# Patient Record
Sex: FEMALE | Race: WHITE | NOT HISPANIC OR LATINO | Employment: OTHER | URBAN - NONMETROPOLITAN AREA
[De-identification: names, ages, dates, MRNs, and addresses within clinical notes are randomized per-mention and may not be internally consistent; named-entity substitution may affect disease eponyms.]

---

## 2017-10-30 DIAGNOSIS — I10 BENIGN ESSENTIAL HTN: ICD-10-CM

## 2017-10-30 DIAGNOSIS — Z13.220 ENCOUNTER FOR LIPID SCREENING FOR CARDIOVASCULAR DISEASE: ICD-10-CM

## 2017-10-30 DIAGNOSIS — Z13.6 ENCOUNTER FOR LIPID SCREENING FOR CARDIOVASCULAR DISEASE: ICD-10-CM

## 2017-10-30 DIAGNOSIS — Z85.820 HISTORY OF MELANOMA: ICD-10-CM

## 2017-10-30 RX ORDER — LISINOPRIL AND HYDROCHLOROTHIAZIDE 25; 20 MG/1; MG/1
1 TABLET ORAL DAILY
Qty: 30 TAB | Refills: 0 | Status: SHIPPED | OUTPATIENT
Start: 2017-10-30 | End: 2017-11-14 | Stop reason: SDUPTHER

## 2017-10-30 NOTE — TELEPHONE ENCOUNTER
Was the patient seen in the last year in this department? No- Has an apt. To establish with you on 11/8/17.    Does patient have an active prescription for medications requested? Yes     Received Request Via: Patient

## 2017-11-14 DIAGNOSIS — I10 BENIGN ESSENTIAL HTN: ICD-10-CM

## 2017-11-14 RX ORDER — LISINOPRIL AND HYDROCHLOROTHIAZIDE 25; 20 MG/1; MG/1
1 TABLET ORAL DAILY
Qty: 30 TAB | Refills: 0 | Status: SHIPPED | OUTPATIENT
Start: 2017-11-14 | End: 2017-12-04 | Stop reason: SDUPTHER

## 2017-12-04 ENCOUNTER — OFFICE VISIT (OUTPATIENT)
Dept: MEDICAL GROUP | Facility: CLINIC | Age: 72
End: 2017-12-04
Payer: COMMERCIAL

## 2017-12-04 VITALS
WEIGHT: 170 LBS | OXYGEN SATURATION: 95 % | DIASTOLIC BLOOD PRESSURE: 78 MMHG | HEIGHT: 60 IN | HEART RATE: 88 BPM | SYSTOLIC BLOOD PRESSURE: 130 MMHG | TEMPERATURE: 99.2 F | BODY MASS INDEX: 33.38 KG/M2

## 2017-12-04 DIAGNOSIS — Z86.69 HISTORY OF CATARACT: ICD-10-CM

## 2017-12-04 DIAGNOSIS — E66.9 OBESITY (BMI 30-39.9): ICD-10-CM

## 2017-12-04 DIAGNOSIS — I10 BENIGN ESSENTIAL HTN: ICD-10-CM

## 2017-12-04 PROCEDURE — 99213 OFFICE O/P EST LOW 20 MIN: CPT | Performed by: PHYSICIAN ASSISTANT

## 2017-12-04 RX ORDER — LISINOPRIL AND HYDROCHLOROTHIAZIDE 25; 20 MG/1; MG/1
1 TABLET ORAL DAILY
Qty: 90 TAB | Refills: 3 | Status: SHIPPED | OUTPATIENT
Start: 2017-12-04

## 2017-12-04 ASSESSMENT — PAIN SCALES - GENERAL: PAINLEVEL: NO PAIN

## 2017-12-04 NOTE — PROGRESS NOTES
Chief Complaint   Patient presents with   • Cataract     Wants referral- Establish Care       HISTORY OF PRESENT ILLNESS: Patient is a 72 y.o. female established patient who presents today for evaluation and management of:    History of cataract  Patient states she's been followed by ophthalmology for this for many years and believes it is getting worse including increased blurry vision. She is due for recheck in requests a referral today.    Benign essential HTN  Well-controlled at this time with blood pressure of 130/78 today and a pulse of 88. Patient denies chest pain, shortness of breath, or urinary changes. She continues taking lisinopril-hydrochlorothiazide 20-25 milligram on a daily basis. She denies any adverse effects of the medication although she is due for repeat lab work.    Obesity (BMI 30-39.9)  Patient has lost 9 pounds over the last year and intends to lose more in the near future. She states she has recently been exercising more.       Patient Active Problem List    Diagnosis Date Noted   • Obesity (BMI 30-39.9) 2017   • Benign essential HTN 2016   • History of cataract 2016   • History of melanoma 2016       Allergies:Patient has no known allergies.    Current Outpatient Prescriptions   Medication Sig Dispense Refill   • lisinopril-hydrochlorothiazide (PRINZIDE, ZESTORETIC) 20-25 MG per tablet Take 1 Tab by mouth every day. 90 Tab 3     No current facility-administered medications for this visit.        Social History   Substance Use Topics   • Smoking status: Former Smoker     Packs/day: 1.00     Years: 30.00     Types: Cigarettes     Quit date: 1998   • Smokeless tobacco: Never Used   • Alcohol use Yes      Comment: Occ       Family Status   Relation Status   • Mother    • Father      Family History   Problem Relation Age of Onset   • Cancer Mother      small cell       Review of Systems:   Constitutional: Negative for fever, chills, unintended  "weight loss and malaise/fatigue.   Eyes: Positive for blurred vision due to cataract.   Respiratory: Negative for cough, shortness of breath.    Cardiovascular: Negative for chest pain, palpitations and leg swelling.    Neurological: Negative for dizziness and headaches.   Endo/Heme/Allergies: Does not bruise/bleed easily.   Psychiatric/Behavioral: Positive for some depression due to 's health status. Negative for suicidal ideas and memory loss.  The patient is not nervous/anxious and does not have insomnia.      Exam:  Blood pressure 130/78, pulse 88, temperature 37.3 °C (99.2 °F), height 1.511 m (4' 11.5\"), weight 77.1 kg (170 lb), SpO2 95 %.  Body mass index is 33.76 kg/m².  General:  Overweight female in NAD  Head: is grossly normal.  Neck:  Thyroid is not visibly enlarged.  Pulmonary: Clear to ausculation. Normal effort. No rales, ronchi, or wheezing.  Cardiovascular: Regular rate and rhythm without murmur. Carotid and radial pulses are intact and equal bilaterally.  Extremities: no clubbing, cyanosis, or edema.    Medical decision-making and discussion:  1. History of cataract  - REFERRAL TO OPHTHALMOLOGY    2. Benign essential HTN  - lisinopril-hydrochlorothiazide (PRINZIDE, ZESTORETIC) 20-25 MG per tablet; Take 1 Tab by mouth every day.  Dispense: 90 Tab; Refill: 3    3. Obesity (BMI 30-39.9)  - Patient identified as having weight management issue.  Appropriate orders and counseling given.        Please note that this dictation was created using voice recognition software. I have made every reasonable attempt to correct obvious errors, but I expect that there are errors of grammar and possibly content that I did not discover before finalizing the note.      Return in about 1 year (around 12/4/2018).  "

## 2017-12-04 NOTE — ASSESSMENT & PLAN NOTE
Patient has lost 9 pounds over the last year and intends to lose more in the near future. She states she has recently been exercising more.

## 2017-12-04 NOTE — ASSESSMENT & PLAN NOTE
Well-controlled at this time with blood pressure of 130/78 today and a pulse of 88. Patient denies chest pain, shortness of breath, or urinary changes. She continues taking lisinopril-hydrochlorothiazide 20-25 milligram on a daily basis. She denies any adverse effects of the medication although she is due for repeat lab work.

## 2017-12-04 NOTE — ASSESSMENT & PLAN NOTE
Patient states she's been followed by ophthalmology for this for many years and believes it is getting worse including increased blurry vision. She is due for recheck in requests a referral today.

## 2018-03-27 ENCOUNTER — TELEPHONE (OUTPATIENT)
Dept: MEDICAL GROUP | Facility: CLINIC | Age: 73
End: 2018-03-27

## 2018-03-27 DIAGNOSIS — R19.5 POSITIVE COLORECTAL CANCER SCREENING USING DNA-BASED STOOL TEST: ICD-10-CM

## 2018-03-28 NOTE — TELEPHONE ENCOUNTER
Patient has a positive cologuard test and was called and notified of these results. She was informed that a colonoscopy was ordered for her on an urgent basis and that she should follow up with this as soon as possible. She confirmed that she will do so.

## 2018-04-04 NOTE — PROGRESS NOTES
I reviewed labs. There is a value I am concerned about and would like to have the patient return for follow up to discuss.

## 2018-05-09 ENCOUNTER — OFFICE VISIT (OUTPATIENT)
Dept: MEDICAL GROUP | Facility: CLINIC | Age: 73
End: 2018-05-09
Payer: COMMERCIAL

## 2018-05-09 VITALS
RESPIRATION RATE: 16 BRPM | DIASTOLIC BLOOD PRESSURE: 78 MMHG | HEART RATE: 86 BPM | HEIGHT: 60 IN | BODY MASS INDEX: 32.59 KG/M2 | OXYGEN SATURATION: 97 % | WEIGHT: 166 LBS | SYSTOLIC BLOOD PRESSURE: 120 MMHG | TEMPERATURE: 97.9 F

## 2018-05-09 DIAGNOSIS — I10 BENIGN ESSENTIAL HTN: ICD-10-CM

## 2018-05-09 DIAGNOSIS — E66.9 OBESITY (BMI 30-39.9): ICD-10-CM

## 2018-05-09 DIAGNOSIS — E78.2 MIXED HYPERLIPIDEMIA: ICD-10-CM

## 2018-05-09 DIAGNOSIS — Z13.6 SCREENING FOR CARDIOVASCULAR CONDITION: ICD-10-CM

## 2018-05-09 PROCEDURE — 99213 OFFICE O/P EST LOW 20 MIN: CPT | Performed by: PHYSICIAN ASSISTANT

## 2018-05-09 RX ORDER — PRAVASTATIN SODIUM 40 MG
40 TABLET ORAL
Qty: 30 TAB | Refills: 1 | Status: SHIPPED | OUTPATIENT
Start: 2018-05-09 | End: 2018-06-27 | Stop reason: SDUPTHER

## 2018-05-09 ASSESSMENT — PATIENT HEALTH QUESTIONNAIRE - PHQ9: CLINICAL INTERPRETATION OF PHQ2 SCORE: 0

## 2018-05-09 NOTE — PROGRESS NOTES
Chief Complaint   Patient presents with   • Hypertension     [facility]       HISTORY OF PRESENT ILLNESS: Patient is a 73 y.o. female established patient who presents today for evaluation and management of:    Mixed hyperlipidemia  Labs are as follows:  Total cholesterol 257,   HDL 74,   triglycerides 127,       This patient states that she has reduced her consumption of animal fats in the past few months and continues to have elevated LDL cholesterol levels. She states that her father had multiple coronary bypasses due to elevated cholesterol levels and she wishes to start medication to reduce her cholesterol at this time. She states her father's bypasses were when he was 74 years old, only 3 years from her current age. She denies blurry vision although she does have occasional lethargy. She denies chest pain and shortness of breath.    Benign essential HTN  Well-controlled at this time with blood pressure of 120/78 today and a pulse of 86. Patient denies chest pain, shortness of breath, or urinary changes. She continues taking lisinopril-hydrochlorothiazide 20-25 milligram on a daily basis. She denies any adverse effects of the medication.       Patient Active Problem List    Diagnosis Date Noted   • Mixed hyperlipidemia 05/09/2018   • Obesity (BMI 30-39.9) 12/04/2017   • Benign essential HTN 09/26/2016   • History of cataract 09/26/2016   • History of melanoma 09/26/2016       Allergies:Patient has no known allergies.    Current Outpatient Prescriptions   Medication Sig Dispense Refill   • pravastatin (PRAVACHOL) 40 MG tablet Take 1 Tab by mouth every bedtime. 30 Tab 1   • lisinopril-hydrochlorothiazide (PRINZIDE, ZESTORETIC) 20-25 MG per tablet Take 1 Tab by mouth every day. 90 Tab 3     No current facility-administered medications for this visit.        Social History   Substance Use Topics   • Smoking status: Former Smoker     Packs/day: 1.00     Years: 30.00     Types: Cigarettes     Quit date: 8/11/1998  "  • Smokeless tobacco: Never Used   • Alcohol use Yes      Comment: Occ       Family Status   Relation Status   • Mother    • Father      Family History   Problem Relation Age of Onset   • Cancer Mother      small cell       Review of Systems:   Constitutional: Negative for fever, chills, unintended weight loss and malaise/fatigue.   HENT: Negative for ear pain, nosebleeds, congestion, sore throat and neck pain.    Eyes: Negative for blurred vision.   Respiratory: Negative for cough, sputum production, shortness of breath and wheezing.    Cardiovascular: Negative for chest pain, palpitations, orthopnea and leg swelling.   Gastrointestinal: Negative for heartburn, nausea, vomiting and abdominal pain.   Genitourinary: Negative for dysuria, urgency and frequency.   Musculoskeletal: Negative for myalgias, back pain and joint pain.   Skin: Negative for rash and itching.   Neurological: Negative for dizziness, tingling, tremors, sensory change, focal weakness and headaches.   Endo/Heme/Allergies: Does not bruise/bleed easily.   Psychiatric/Behavioral: Negative for depression, suicidal ideas and memory loss.  The patient is not nervous/anxious and does not have insomnia.      Exam:  Blood pressure 120/78, pulse 86, temperature 36.6 °C (97.9 °F), resp. rate 16, height 1.511 m (4' 11.5\"), weight 75.3 kg (166 lb), SpO2 97 %.  Body mass index is 32.97 kg/m².  General:  Obese female in NAD  Head: is grossly normal.  Neck: Supple without masses. Thyroid is not visibly enlarged.  Pulmonary: Clear to ausculation. Normal effort. No rales, ronchi, or wheezing.  Cardiovascular: Regular rate and rhythm without murmur. Carotid and radial pulses are intact and equal bilaterally.  Extremities: no clubbing, cyanosis, or edema.    Medical decision-making and discussion:  1. Benign essential HTN  Continue current medication.  - COMP METABOLIC PANEL; Future    2. Screening for cardiovascular condition  - LIPID PROFILE; " Future    3. Mixed hyperlipidemia  Patient was started on pravastatin today. Side effects such as myalgia were explained today.  - LIPID PROFILE; Future  - pravastatin (PRAVACHOL) 40 MG tablet; Take 1 Tab by mouth every bedtime.  Dispense: 30 Tab; Refill: 1    4. Obesity (BMI 30-39.9)  - Patient identified as having weight management issue.  Appropriate orders and counseling given.      Please note that this dictation was created using voice recognition software. I have made every reasonable attempt to correct obvious errors, but I expect that there are errors of grammar and possibly content that I did not discover before finalizing the note.      Return in about 6 weeks (around 6/20/2018) for HTN, HLD.

## 2018-05-09 NOTE — ASSESSMENT & PLAN NOTE
Labs are as follows:  Total cholesterol 257,   HDL 74,   triglycerides 127,       This patient states that she has reduced her consumption of animal fats in the past few months and continues to have elevated LDL cholesterol levels. She states that her father had multiple coronary bypasses due to elevated cholesterol levels and she wishes to start medication to reduce her cholesterol at this time. She states her father's bypasses were when he was 74 years old, only 3 years from her current age. She denies blurry vision although she does have occasional lethargy. She denies chest pain and shortness of breath.

## 2018-05-09 NOTE — ASSESSMENT & PLAN NOTE
Well-controlled at this time with blood pressure of 120/78 today and a pulse of 86. Patient denies chest pain, shortness of breath, or urinary changes. She continues taking lisinopril-hydrochlorothiazide 20-25 milligram on a daily basis. She denies any adverse effects of the medication.

## 2018-06-27 ENCOUNTER — OFFICE VISIT (OUTPATIENT)
Dept: MEDICAL GROUP | Facility: CLINIC | Age: 73
End: 2018-06-27
Payer: COMMERCIAL

## 2018-06-27 VITALS
HEIGHT: 60 IN | RESPIRATION RATE: 12 BRPM | OXYGEN SATURATION: 96 % | HEART RATE: 84 BPM | SYSTOLIC BLOOD PRESSURE: 128 MMHG | TEMPERATURE: 98.6 F | DIASTOLIC BLOOD PRESSURE: 74 MMHG | BODY MASS INDEX: 32.2 KG/M2 | WEIGHT: 164 LBS

## 2018-06-27 DIAGNOSIS — E78.2 MIXED HYPERLIPIDEMIA: ICD-10-CM

## 2018-06-27 DIAGNOSIS — K63.5 POLYP OF DESCENDING COLON, UNSPECIFIED TYPE: ICD-10-CM

## 2018-06-27 PROCEDURE — 99213 OFFICE O/P EST LOW 20 MIN: CPT | Performed by: PHYSICIAN ASSISTANT

## 2018-06-27 RX ORDER — PRAVASTATIN SODIUM 40 MG
40 TABLET ORAL
Qty: 90 TAB | Refills: 1 | Status: SHIPPED | OUTPATIENT
Start: 2018-06-27

## 2018-06-27 ASSESSMENT — PAIN SCALES - GENERAL: PAINLEVEL: NO PAIN

## 2018-06-27 NOTE — ASSESSMENT & PLAN NOTE
Discovered on colonoscopy, this patient will continue follow-up with gastroenterology for this problem.  She had approximately 8 polyps removed.

## 2018-06-27 NOTE — PROGRESS NOTES
Chief Complaint   Patient presents with   • Follow-Up       HISTORY OF PRESENT ILLNESS: Patient is a 73 y.o. female established patient who presents today for evaluation and management of:    Polyp of descending colon  Discovered on colonoscopy, this patient will continue follow-up with gastroenterology for this problem.  She had approximately 8 polyps removed.    Mixed hyperlipidemia  Labs as of 4/2018 are as follows:  Total cholesterol 257,   HDL 74,   triglycerides 127,       Labs as of 6/20/2018 are as follows:   Total cholesterol: 107  HDL: 76  Triglycerides: 110  LDL: 109    This patient states that she has reduced her consumption of animal fats in the past few months as well as starting 20mg pravastatin daily and now has well controlled LDL cholesterol levels. She states that her father had multiple coronary bypasses due to elevated cholesterol levels. She states her father's bypasses were when he was 74 years old, only 3 years from her current age. She denies blurry vision although she does have occasional lethargy. She denies chest pain and shortness of breath.  She is requesting medication refills at this time.       Patient Active Problem List    Diagnosis Date Noted   • Polyp of descending colon 06/27/2018   • Mixed hyperlipidemia 05/09/2018   • Obesity (BMI 30-39.9) 12/04/2017   • Benign essential HTN 09/26/2016   • History of cataract 09/26/2016   • History of melanoma 09/26/2016       Allergies:Patient has no known allergies.    Current Outpatient Prescriptions   Medication Sig Dispense Refill   • pravastatin (PRAVACHOL) 40 MG tablet Take 1 Tab by mouth every bedtime. 90 Tab 1   • lisinopril-hydrochlorothiazide (PRINZIDE, ZESTORETIC) 20-25 MG per tablet Take 1 Tab by mouth every day. 90 Tab 3     No current facility-administered medications for this visit.        Social History   Substance Use Topics   • Smoking status: Former Smoker     Packs/day: 1.00     Years: 30.00     Types: Cigarettes     " Quit date: 1998   • Smokeless tobacco: Never Used   • Alcohol use Yes      Comment: Occ       Family Status   Relation Status   • Mother    • Father      Family History   Problem Relation Age of Onset   • Cancer Mother      small cell   • Hyperlipidemia Father        Review of Systems: See HPI above.   Constitutional: Negative for fever, chills, weight loss and malaise/fatigue.   HENT: Negative for ear pain, nosebleeds, congestion, sore throat and neck pain.    Eyes: Negative for blurred vision.   Respiratory: Negative for cough, sputum production, shortness of breath and wheezing.    Cardiovascular: Negative for chest pain, palpitations, orthopnea and leg swelling.   Gastrointestinal: Negative for heartburn, nausea, vomiting and abdominal pain.   Genitourinary: Negative for dysuria, urgency and frequency.   Musculoskeletal: Negative for myalgias, back pain and joint pain.   Skin: Negative for rash and itching.   Neurological: Negative for dizziness, tingling, tremors, sensory change, focal weakness and headaches.   Endo/Heme/Allergies: Does not bruise/bleed easily.   Psychiatric/Behavioral: Negative for depression, suicidal ideas and memory loss.  The patient is not nervous/anxious and does not have insomnia.      Exam:  Blood pressure 128/74, pulse 84, temperature 37 °C (98.6 °F), resp. rate 12, height 1.511 m (4' 11.5\"), weight 74.4 kg (164 lb), SpO2 96 %.  Body mass index is 32.57 kg/m².  General:  Overweight female in NAD  Head: is grossly normal.  Neck: Supple without masses. Thyroid is not visibly enlarged.  Pulmonary: Clear to ausculation. Normal effort. No rales, ronchi, or wheezing.  Cardiovascular: Regular rate and rhythm without murmur. Carotid and radial pulses are intact and equal bilaterally.  Extremities: no clubbing, cyanosis, or edema.    Medical decision-making and discussion:  1. Mixed hyperlipidemia  - pravastatin (PRAVACHOL) 40 MG tablet; Take 1 Tab by mouth every bedtime. "  Dispense: 90 Tab; Refill: 1  - LIPID PROFILE; Future    2. Polyp of descending colon, unspecified type  Continue follow-up with gastroenterology as directed.      Please note that this dictation was created using voice recognition software. I have made every reasonable attempt to correct obvious errors, but I expect that there are errors of grammar and possibly content that I did not discover before finalizing the note.      Return in about 6 months (around 12/27/2018) for cholesterol.

## 2018-06-27 NOTE — ASSESSMENT & PLAN NOTE
Labs as of 4/2018 are as follows:  Total cholesterol 257,   HDL 74,   triglycerides 127,       Labs as of 6/20/2018 are as follows:   Total cholesterol: 107  HDL: 76  Triglycerides: 110  LDL: 109    This patient states that she has reduced her consumption of animal fats in the past few months as well as starting 20mg pravastatin daily and now has well controlled LDL cholesterol levels. She states that her father had multiple coronary bypasses due to elevated cholesterol levels. She states her father's bypasses were when he was 74 years old, only 3 years from her current age. She denies blurry vision although she does have occasional lethargy. She denies chest pain and shortness of breath.  She is requesting medication refills at this time.

## 2024-05-06 ENCOUNTER — TELEPHONE (OUTPATIENT)
Dept: SURGICAL ONCOLOGY | Facility: MEDICAL CENTER | Age: 79
End: 2024-05-06

## 2024-05-06 NOTE — TELEPHONE ENCOUNTER
Patient called to let us know she called [facility] and they are working on getting an authorization for her to be seen with Dr. Rehman, I tried to explain to her we are not contracted with her insurance and tried to go over options but patient did not want to listen and told me she is working on it.

## 2024-05-07 ENCOUNTER — TELEPHONE (OUTPATIENT)
Dept: SURGICAL ONCOLOGY | Facility: MEDICAL CENTER | Age: 79
End: 2024-05-07